# Patient Record
Sex: FEMALE | Race: WHITE | NOT HISPANIC OR LATINO | URBAN - METROPOLITAN AREA
[De-identification: names, ages, dates, MRNs, and addresses within clinical notes are randomized per-mention and may not be internally consistent; named-entity substitution may affect disease eponyms.]

---

## 2020-07-06 ENCOUNTER — WEB ENCOUNTER (OUTPATIENT)
Dept: URBAN - METROPOLITAN AREA CLINIC 35 | Facility: CLINIC | Age: 50
End: 2020-07-06

## 2020-07-08 ENCOUNTER — WEB ENCOUNTER (OUTPATIENT)
Dept: URBAN - METROPOLITAN AREA CLINIC 35 | Facility: CLINIC | Age: 50
End: 2020-07-08

## 2020-08-14 ENCOUNTER — OFFICE VISIT (OUTPATIENT)
Dept: URBAN - METROPOLITAN AREA CLINIC 35 | Facility: CLINIC | Age: 50
End: 2020-08-14

## 2020-08-14 VITALS
BODY MASS INDEX: 22.82 KG/M2 | HEIGHT: 65 IN | WEIGHT: 137 LBS | DIASTOLIC BLOOD PRESSURE: 60 MMHG | HEART RATE: 78 BPM | OXYGEN SATURATION: 98 % | SYSTOLIC BLOOD PRESSURE: 100 MMHG

## 2020-08-14 PROBLEM — 31297008 SOMATOFORM DISORDER: Status: ACTIVE | Noted: 2020-08-14

## 2020-08-14 RX ORDER — SODIUM SULFATE, POTASSIUM SULFATE, MAGNESIUM SULFATE 17.5; 3.13; 1.6 G/ML; G/ML; G/ML
AS DIRECTED SOLUTION, CONCENTRATE ORAL
Qty: 1 | Refills: 0 | Status: ON HOLD | COMMUNITY
Start: 2018-02-06

## 2020-08-14 RX ORDER — ATENOLOL 50 MG/1
1 TABLET TABLET ORAL ONCE A DAY
Status: DISCONTINUED | COMMUNITY

## 2020-08-14 RX ORDER — PANTOPRAZOLE SODIUM 40 MG/1
1 TABLET TABLET, DELAYED RELEASE ORAL ONCE A DAY
Qty: 90 TABLET | Refills: 2 | Status: ACTIVE | COMMUNITY
Start: 2018-04-27

## 2020-08-14 RX ORDER — B-COMPLEX WITH VITAMIN C
1 TABLET TABLET ORAL ONCE A DAY
Qty: 30 | Status: ACTIVE | COMMUNITY

## 2020-08-14 RX ORDER — FAMOTIDINE 40 MG/1
1 TABLET AT BEDTIME TABLET, FILM COATED ORAL ONCE A DAY
Qty: 30 | Refills: 2 | OUTPATIENT
Start: 2020-08-14

## 2020-08-14 RX ORDER — UBIDECARENONE 30 MG
CAPSULE ORAL
Status: ACTIVE | COMMUNITY

## 2020-08-14 RX ORDER — CLONAZEPAM 0.5 MG/1
1 TABLET AT BEDTIME TABLET ORAL ONCE A DAY
Status: ACTIVE | COMMUNITY

## 2020-08-14 RX ORDER — MULTIVIT-MIN/IRON/FOLIC ACID/K 18-600-40
AS DIRECTED CAPSULE ORAL
Status: ACTIVE | COMMUNITY

## 2020-08-14 NOTE — EXAM-MIGRATED EXAMINATIONS
GENERAL APPEARANCE: - pleasant, well nourished, well developed, in no acute distress;   ORAL CAVITY: - mucosa moist;   HEART: - S1, S2 normal, regular rate and rhythm;   LUNGS: - clear to auscultation bilaterally;   ABDOMEN: - soft, nontender, nondistended, no rebound tenderness, bowel sounds present;

## 2020-08-14 NOTE — HPI-MIGRATED HPI
;   ;     Follow-up : Patient presents today for with c/o acid reflux acid. She admits her symptoms began 2 months and describes them as a globus sensation, a raspy voice in the morning, she c/o intermittent nausea after eating no matter the content of the food. She reports that these symptoms occur 2-3 times a week.   She c/o that she will have a feeling of pressure starting at the bottom of her esophagus that will radiate to the top of esophagus.  She denies dysphagia at this time.    She states she will feel pressure in her chest every time after she eats.  She states she had a cardiac workup that was normal.  She admits that drinking any alcohol will burn her stomach, she reports that these are new symptoms.    She has pressure in her chest.  Patient currently reports bowel movements everyday or every other day. Her stools are formed with no blood, mucus, or melena present.   She is currently reports taking Pantoprazole 40 mg QAM and states that occasionally she will take Tums if needed. She reports that this medication hasn't helped with her symptoms in about 2 months.    Last visit (5/10/2019) Patient presents today for follow up after her EGD.  Since the procedure patient denies dysphagia, globus, changes in appetite, and changes in bowel habits. She continues to take Pantoprazole 40 mg on tab daily with on issues.;   Belching : Patient admits episodes of sporadic belching since her last office visit even after drinking water. She reports that she experience these symptoms daily. Patient admits that she has made dietary changes to try and resolve symptoms with no relief.   Last visit (5/10/2019) Patient complains of belching frequently.  She states without eating, she will still find herself belching a lot.;

## 2020-09-14 ENCOUNTER — OFFICE VISIT (OUTPATIENT)
Dept: URBAN - METROPOLITAN AREA CLINIC 35 | Facility: CLINIC | Age: 50
End: 2020-09-14

## 2020-09-14 VITALS — WEIGHT: 135 LBS | BODY MASS INDEX: 22.49 KG/M2 | HEART RATE: 77 BPM | TEMPERATURE: 98.6 F | HEIGHT: 65 IN

## 2020-09-14 RX ORDER — MULTIVIT-MIN/IRON/FOLIC ACID/K 18-600-40
AS DIRECTED CAPSULE ORAL
Status: ACTIVE | COMMUNITY

## 2020-09-14 RX ORDER — UBIDECARENONE 30 MG
CAPSULE ORAL
Status: ACTIVE | COMMUNITY

## 2020-09-14 RX ORDER — B-COMPLEX WITH VITAMIN C
1 TABLET TABLET ORAL ONCE A DAY
Qty: 30 | Status: ACTIVE | COMMUNITY

## 2020-09-14 RX ORDER — FAMOTIDINE 40 MG/1
1 TABLET AT BEDTIME TABLET, FILM COATED ORAL ONCE A DAY
Qty: 30 | Refills: 2 | Status: ACTIVE | COMMUNITY
Start: 2020-08-14

## 2020-09-14 RX ORDER — DEXLANSOPRAZOLE 60 MG/1
1 CAPSULE CAPSULE, DELAYED RELEASE ORAL ONCE A DAY
Qty: 30 | Refills: 6 | OUTPATIENT
Start: 2020-09-14

## 2020-09-14 RX ORDER — CLONAZEPAM 0.5 MG/1
1 TABLET AT BEDTIME TABLET ORAL ONCE A DAY
Status: ACTIVE | COMMUNITY

## 2020-09-14 RX ORDER — PANTOPRAZOLE SODIUM 40 MG/1
1 TABLET TABLET, DELAYED RELEASE ORAL ONCE A DAY
Qty: 90 TABLET | Refills: 2 | Status: ACTIVE | COMMUNITY
Start: 2018-04-27

## 2020-09-14 RX ORDER — SODIUM SULFATE, POTASSIUM SULFATE, MAGNESIUM SULFATE 17.5; 3.13; 1.6 G/ML; G/ML; G/ML
AS DIRECTED SOLUTION, CONCENTRATE ORAL
Qty: 1 | Refills: 0 | Status: ON HOLD | COMMUNITY
Start: 2018-02-06

## 2020-09-14 NOTE — HPI-MIGRATED HPI
;   ;     Belching : Admits Belching with Dexilant and Famotidine. But since she is back to Pantoprazole, she has been belching post prandial daily.   Last visit (08/14/2020)              Patient admits episodes of sporadic belching since her last office visit even after drinking water. She reports that she experience these symptoms daily. Patient admits that she has made dietary changes to try and resolve symptoms with no relief.   Last visit (5/10/2019) Patient complains of belching frequently.  She states without eating, she will still find herself belching a lot.;   Acid Reflux : Patient is a 50-year-old  female, who presents today to follow up for acid reflux. Admits relief of acid reflux with Dexilant in the morning and  Famotidine Tablet, 40 MG at night. However, since she is back on Pantoprazole, she has been experiencing acid reflux again.   Also admits bloating/gas.  Patient denies dysphagia, globus, sour eructations, indigestion, early satiety, changes in appetite, coughing, nausea, vomiting, abdominal/epigastric pain, or changes in bowel habits.  Patient states that she has not had any recent changes in her medications.  Last visit (08/14/2020)              Patient presents today for with c/o acid reflux acid. She admits her symptoms began 2 months and describes them as a globus sensation, a raspy voice in the morning, she c/o intermittent nausea after eating no matter the content of the food. She reports that these symptoms occur 2-3 times a week.   She c/o that she will have a feeling of pressure starting at the bottom of her esophagus that will radiate to the top of esophagus.  She denies dysphagia at this time.    She states she will feel pressure in her chest every time after she eats.  She states she had a cardiac workup that was normal.  She admits that drinking any alcohol will burn her stomach, she reports that these are new symptoms.    She has pressure in her chest.  Patient currently reports bowel movements everyday or every other day. Her stools are formed with no blood, mucus, or melena present.   She is currently reports taking Pantoprazole 40 mg QAM and states that occasionally she will take Tums if needed. She reports that this medication hasn't helped with her symptoms in about 2 months.    Last visit (5/10/2019) Patient presents today for follow up after her EGD.  Since the procedure patient denies dysphagia, globus, changes in appetite, and changes in bowel habits. She continues to take Pantoprazole 40 mg on tab daily with on issues.;

## 2020-09-16 ENCOUNTER — TELEPHONE ENCOUNTER (OUTPATIENT)
Dept: URBAN - METROPOLITAN AREA CLINIC 35 | Facility: CLINIC | Age: 50
End: 2020-09-16

## 2020-09-24 ENCOUNTER — WEB ENCOUNTER (OUTPATIENT)
Dept: URBAN - METROPOLITAN AREA CLINIC 35 | Facility: CLINIC | Age: 50
End: 2020-09-24

## 2020-09-24 ENCOUNTER — TELEPHONE ENCOUNTER (OUTPATIENT)
Dept: URBAN - METROPOLITAN AREA CLINIC 35 | Facility: CLINIC | Age: 50
End: 2020-09-24

## 2020-09-25 ENCOUNTER — WEB ENCOUNTER (OUTPATIENT)
Dept: URBAN - METROPOLITAN AREA CLINIC 35 | Facility: CLINIC | Age: 50
End: 2020-09-25

## 2020-09-25 RX ORDER — DEXLANSOPRAZOLE 60 MG/1
1 CAPSULE CAPSULE, DELAYED RELEASE ORAL ONCE A DAY
Qty: 90 CAPSULE | Refills: 1
Start: 2020-09-14

## 2020-12-08 ENCOUNTER — WEB ENCOUNTER (OUTPATIENT)
Dept: URBAN - METROPOLITAN AREA CLINIC 35 | Facility: CLINIC | Age: 50
End: 2020-12-08

## 2020-12-14 ENCOUNTER — OFFICE VISIT (OUTPATIENT)
Dept: URBAN - METROPOLITAN AREA CLINIC 35 | Facility: CLINIC | Age: 50
End: 2020-12-14

## 2020-12-14 NOTE — HPI-MIGRATED HPI
;   ;     Hoyos's Esophagus : Patient is a 50-year-old  female, who presents today to follow up for Hoyos's esophagus. Admits relief w/ Dexilant Capsule Delayed Release, 60 MG, 1 capsule? Famotidine Tablet, 40 MG at night  Last visit (09/14/2020)       Patient is a 50-year-old  female, who presents today to follow up for acid reflux. Admits relief of acid reflux with Dexilant in the morning and  Famotidine Tablet, 40 MG at night. However, since she is back on Pantoprazole, she has been experiencing acid reflux again.   Also admits bloating/gas.  Patient denies dysphagia, globus, sour eructations, indigestion, early satiety, changes in appetite, coughing, nausea, vomiting, abdominal/epigastric pain, or changes in bowel habits.  Patient states that she has not had any recent changes in her medications.  Last visit (08/14/2020)              Patient presents today for with c/o acid reflux acid. She admits her symptoms began 2 months and describes them as a globus sensation, a raspy voice in the morning, she c/o intermittent nausea after eating no matter the content of the food. She reports that these symptoms occur 2-3 times a week.   She c/o that she will have a feeling of pressure starting at the bottom of her esophagus that will radiate to the top of esophagus.  She denies dysphagia at this time.    She states she will feel pressure in her chest every time after she eats.  She states she had a cardiac workup that was normal.  She admits that drinking any alcohol will burn her stomach, she reports that these are new symptoms.    She has pressure in her chest.  Patient currently reports bowel movements everyday or every other day. Her stools are formed with no blood, mucus, or melena present.   She is currently reports taking Pantoprazole 40 mg QAM and states that occasionally she will take Tums if needed. She reports that this medication hasn't helped with her symptoms in about 2 months.    Last visit (5/10/2019) Patient presents today for follow up after her EGD.  Since the procedure patient denies dysphagia, globus, changes in appetite, and changes in bowel habits. She continues to take Pantoprazole 40 mg on tab daily with on issues.;   Belching :     Last visit (09/14/2020)       Admits Belching with Dexilant and Famotidine. But since she is back to Pantoprazole, she has been belching post prandial daily.   Last visit (08/14/2020)              Patient admits episodes of sporadic belching since her last office visit even after drinking water. She reports that she experience these symptoms daily. Patient admits that she has made dietary changes to try and resolve symptoms with no relief.   Last visit (5/10/2019) Patient complains of belching frequently.  She states without eating, she will still find herself belching a lot.;

## 2020-12-28 ENCOUNTER — TELEPHONE ENCOUNTER (OUTPATIENT)
Dept: URBAN - METROPOLITAN AREA CLINIC 35 | Facility: CLINIC | Age: 50
End: 2020-12-28

## 2020-12-29 ENCOUNTER — TELEPHONE ENCOUNTER (OUTPATIENT)
Dept: URBAN - METROPOLITAN AREA SURGERY CENTER 8 | Facility: SURGERY CENTER | Age: 50
End: 2020-12-29

## 2020-12-29 ENCOUNTER — OFFICE VISIT (OUTPATIENT)
Dept: URBAN - METROPOLITAN AREA CLINIC 33 | Facility: CLINIC | Age: 50
End: 2020-12-29

## 2020-12-29 VITALS
HEART RATE: 74 BPM | OXYGEN SATURATION: 99 % | HEIGHT: 65 IN | TEMPERATURE: 96.9 F | DIASTOLIC BLOOD PRESSURE: 70 MMHG | SYSTOLIC BLOOD PRESSURE: 110 MMHG | BODY MASS INDEX: 23.32 KG/M2 | WEIGHT: 140 LBS

## 2020-12-29 PROBLEM — 72007001 DUODENITIS: Status: ACTIVE | Noted: 2019-05-10

## 2020-12-29 RX ORDER — B-COMPLEX WITH VITAMIN C
1 TABLET TABLET ORAL ONCE A DAY
Qty: 30 | Status: ACTIVE | COMMUNITY

## 2020-12-29 RX ORDER — FAMOTIDINE 40 MG/1
1 TABLET AT BEDTIME TABLET, FILM COATED ORAL ONCE A DAY
Qty: 30 | Refills: 2 | Status: ON HOLD | COMMUNITY
Start: 2020-08-14

## 2020-12-29 RX ORDER — UBIDECARENONE 30 MG
CAPSULE ORAL
Status: ACTIVE | COMMUNITY

## 2020-12-29 RX ORDER — CLONAZEPAM 0.5 MG/1
1 TABLET AT BEDTIME TABLET ORAL ONCE A DAY
Status: ACTIVE | COMMUNITY

## 2020-12-29 RX ORDER — SODIUM SULFATE, POTASSIUM SULFATE, MAGNESIUM SULFATE 17.5; 3.13; 1.6 G/ML; G/ML; G/ML
AS DIRECTED SOLUTION, CONCENTRATE ORAL
Qty: 1 | Refills: 0 | Status: ON HOLD | COMMUNITY
Start: 2018-02-06

## 2020-12-29 RX ORDER — MULTIVIT-MIN/IRON/FOLIC ACID/K 18-600-40
AS DIRECTED CAPSULE ORAL
Status: ACTIVE | COMMUNITY

## 2020-12-29 RX ORDER — SUCRALFATE 1 G/10ML
10 ML ON AN EMPTY STOMACH BEFORE MEALS SUSPENSION ORAL TWICE A DAY
Qty: 280 ML | Refills: 0 | OUTPATIENT
Start: 2020-12-29

## 2020-12-29 RX ORDER — DEXLANSOPRAZOLE 60 MG/1
1 CAPSULE CAPSULE, DELAYED RELEASE ORAL ONCE A DAY
Qty: 90 CAPSULE | Refills: 1 | Status: ACTIVE | COMMUNITY
Start: 2020-09-14

## 2020-12-29 RX ORDER — PANTOPRAZOLE SODIUM 40 MG/1
1 TABLET TABLET, DELAYED RELEASE ORAL ONCE A DAY
Qty: 90 TABLET | Refills: 2 | Status: ON HOLD | COMMUNITY
Start: 2018-04-27

## 2020-12-29 NOTE — HPI-MIGRATED HPI
;   ;     Hoyos's Esophagus : Patient is a 50-year-old  female, who presents today to follow up for Hoyos's esophagus. admits to some relief w/ Dexilant Capsule Delayed Release, 60 MG, 1 capsule. She admits the Dexilant has helped with the belching. Denies any relief of heartburn and a burning sensation in her epigastric region. Denies continued use of Famotidine Tablet, 40 MG at night.   Admits to occasional episodes of constipation. Currently has 1-2 BM's a day. Stools are soft and formed, Denies blood, mucus or melena.  Denies any OTC medications for relief of constipation symptoms. She states she will having some occasional episodes of bloating/gas when she is constipated.      Last visit (09/14/2020)       Patient is a 50-year-old  female, who presents today to follow up for acid reflux. Admits relief of acid reflux with Dexilant in the morning and  Famotidine Tablet, 40 MG at night. However, since she is back on Pantoprazole, she has been experiencing acid reflux again.   Also admits bloating/gas.  Patient denies dysphagia, globus, sour eructations, indigestion, early satiety, changes in appetite, coughing, nausea, vomiting, abdominal/epigastric pain, or changes in bowel habits.  Patient states that she has not had any recent changes in her medications.  Last visit (08/14/2020)              Patient presents today for with c/o acid reflux acid. She admits her symptoms began 2 months and describes them as a globus sensation, a raspy voice in the morning, she c/o intermittent nausea after eating no matter the content of the food. She reports that these symptoms occur 2-3 times a week.   She c/o that she will have a feeling of pressure starting at the bottom of her esophagus that will radiate to the top of esophagus.  She denies dysphagia at this time.    She states she will feel pressure in her chest every time after she eats.  She states she had a cardiac workup that was normal.  She admits that drinking any alcohol will burn her stomach, she reports that these are new symptoms.    She has pressure in her chest.  Patient currently reports bowel movements everyday or every other day. Her stools are formed with no blood, mucus, or melena present.   She is currently reports taking Pantoprazole 40 mg QAM and states that occasionally she will take Tums if needed. She reports that this medication hasn't helped with her symptoms in about 2 months.    Last visit (5/10/2019) Patient presents today for follow up after her EGD.  Since the procedure patient denies dysphagia, globus, changes in appetite, and changes in bowel habits. She continues to take Pantoprazole 40 mg on tab daily with on issues.;   Belching : Denies any episodes of Belching since her last visit. Admits symptoms are controlled by Dexilant 60 mg.   Last visit (09/14/2020)       Admits Belching with Dexilant and Famotidine. But since she is back to Pantoprazole, she has been belching post prandial daily.   Last visit (08/14/2020)              Patient admits episodes of sporadic belching since her last office visit even after drinking water. She reports that she experience these symptoms daily. Patient admits that she has made dietary changes to try and resolve symptoms with no relief.   Last visit (5/10/2019) Patient complains of belching frequently.  She states without eating, she will still find herself belching a lot.;

## 2021-01-06 ENCOUNTER — TELEPHONE ENCOUNTER (OUTPATIENT)
Dept: URBAN - METROPOLITAN AREA CLINIC 35 | Facility: CLINIC | Age: 51
End: 2021-01-06

## 2021-01-07 ENCOUNTER — TELEPHONE ENCOUNTER (OUTPATIENT)
Dept: URBAN - METROPOLITAN AREA CLINIC 35 | Facility: CLINIC | Age: 51
End: 2021-01-07

## 2021-01-11 ENCOUNTER — OFFICE VISIT (OUTPATIENT)
Dept: URBAN - METROPOLITAN AREA CLINIC 35 | Facility: CLINIC | Age: 51
End: 2021-01-11

## 2021-01-22 ENCOUNTER — OFFICE VISIT (OUTPATIENT)
Dept: URBAN - METROPOLITAN AREA SURGERY CENTER 8 | Facility: SURGERY CENTER | Age: 51
End: 2021-01-22

## 2021-01-22 ENCOUNTER — WEB ENCOUNTER (OUTPATIENT)
Dept: URBAN - METROPOLITAN AREA CLINIC 35 | Facility: CLINIC | Age: 51
End: 2021-01-22

## 2021-02-08 ENCOUNTER — OFFICE VISIT (OUTPATIENT)
Dept: URBAN - METROPOLITAN AREA CLINIC 35 | Facility: CLINIC | Age: 51
End: 2021-02-08

## 2021-02-08 ENCOUNTER — TELEPHONE ENCOUNTER (OUTPATIENT)
Dept: URBAN - METROPOLITAN AREA CLINIC 35 | Facility: CLINIC | Age: 51
End: 2021-02-08

## 2021-02-08 VITALS
TEMPERATURE: 97.7 F | HEART RATE: 73 BPM | HEIGHT: 65 IN | BODY MASS INDEX: 23.72 KG/M2 | OXYGEN SATURATION: 98 % | SYSTOLIC BLOOD PRESSURE: 105 MMHG | WEIGHT: 142.4 LBS | DIASTOLIC BLOOD PRESSURE: 65 MMHG

## 2021-02-08 RX ORDER — MULTIVIT-MIN/IRON/FOLIC ACID/K 18-600-40
AS DIRECTED CAPSULE ORAL
Status: ACTIVE | COMMUNITY

## 2021-02-08 RX ORDER — SODIUM SULFATE, POTASSIUM SULFATE, MAGNESIUM SULFATE 17.5; 3.13; 1.6 G/ML; G/ML; G/ML
AS DIRECTED SOLUTION, CONCENTRATE ORAL
Qty: 1 | Refills: 0 | Status: ON HOLD | COMMUNITY
Start: 2018-02-06

## 2021-02-08 RX ORDER — CLONAZEPAM 0.5 MG/1
1 TABLET AT BEDTIME TABLET ORAL ONCE A DAY
Status: ACTIVE | COMMUNITY

## 2021-02-08 RX ORDER — UBIDECARENONE 30 MG
CAPSULE ORAL
Status: ACTIVE | COMMUNITY

## 2021-02-08 RX ORDER — FAMOTIDINE 40 MG/1
1 TABLET AT BEDTIME TABLET, FILM COATED ORAL ONCE A DAY
Qty: 30 | Refills: 2 | Status: ON HOLD | COMMUNITY
Start: 2020-08-14

## 2021-02-08 RX ORDER — B-COMPLEX WITH VITAMIN C
1 TABLET TABLET ORAL ONCE A DAY
Qty: 30 | Status: ACTIVE | COMMUNITY

## 2021-02-08 RX ORDER — DEXLANSOPRAZOLE 60 MG/1
1 CAPSULE CAPSULE, DELAYED RELEASE ORAL ONCE A DAY
Qty: 90 CAPSULE | Refills: 1 | Status: ACTIVE | COMMUNITY
Start: 2020-09-14

## 2021-02-08 RX ORDER — PANTOPRAZOLE SODIUM 40 MG/1
1 TABLET TABLET, DELAYED RELEASE ORAL ONCE A DAY
Qty: 90 TABLET | Refills: 2 | Status: ON HOLD | COMMUNITY
Start: 2018-04-27

## 2021-02-08 RX ORDER — SUCRALFATE 1 G/10ML
10 ML ON AN EMPTY STOMACH BEFORE MEALS SUSPENSION ORAL TWICE A DAY
Qty: 280 ML | Refills: 0 | Status: ON HOLD | COMMUNITY
Start: 2020-12-29

## 2021-02-08 NOTE — HPI-MIGRATED HPI
;   ;     Hoyos's Esophagus : Patient is a 50-year-old  female who presents today to follow up s/p EGD. EGD performed on 01/22/2021. Patient denies any complications after the procedure.  She currently takes Dexilant Capsule Delayed Release, 60 MG, 1 capsule a day. She admits relief of epigastric burning sensation with Carafate Suspension, 1 GM/10ML Twice a day for 2 weeeks. She still admits burning sensation in epigastrium area, now that she does not take Carafate. Gaviscon somewhat relieved burning sensation. Denies new symptoms.    Last visit (12/19/2020)              Patient is a 50-year-old  female, who presents today to follow up for Hoyos's esophagus. admits to some relief w/ Dexilant Capsule Delayed Release, 60 MG, 1 capsule. She admits the Dexilant has helped with the belching. Denies any relief of heartburn and a burning sensation in her epigastric region. Denies continued use of Famotidine Tablet, 40 MG at night.   Admits to occasional episodes of constipation. Currently has 1-2 BM's a day. Stools are soft and formed, Denies blood, mucus or melena.  Denies any OTC medications for relief of constipation symptoms. She states she will having some occasional episodes of bloating/gas when she is constipated.      Last visit (09/14/2020)       Patient is a 50-year-old  female, who presents today to follow up for acid reflux. Admits relief of acid reflux with Dexilant in the morning and  Famotidine Tablet, 40 MG at night. However, since she is back on Pantoprazole, she has been experiencing acid reflux again.   Also admits bloating/gas.  Patient denies dysphagia, globus, sour eructations, indigestion, early satiety, changes in appetite, coughing, nausea, vomiting, abdominal/epigastric pain, or changes in bowel habits.  Patient states that she has not had any recent changes in her medications.  Last visit (08/14/2020)              Patient presents today for with c/o acid reflux acid. She admits her symptoms began 2 months and describes them as a globus sensation, a raspy voice in the morning, she c/o intermittent nausea after eating no matter the content of the food. She reports that these symptoms occur 2-3 times a week.   She c/o that she will have a feeling of pressure starting at the bottom of her esophagus that will radiate to the top of esophagus.  She denies dysphagia at this time.    She states she will feel pressure in her chest every time after she eats.  She states she had a cardiac workup that was normal.  She admits that drinking any alcohol will burn her stomach, she reports that these are new symptoms.    She has pressure in her chest.  Patient currently reports bowel movements everyday or every other day. Her stools are formed with no blood, mucus, or melena present.   She is currently reports taking Pantoprazole 40 mg QAM and states that occasionally she will take Tums if needed. She reports that this medication hasn't helped with her symptoms in about 2 months.    Last visit (5/10/2019) Patient presents today for follow up after her EGD.  Since the procedure patient denies dysphagia, globus, changes in appetite, and changes in bowel habits. She continues to take Pantoprazole 40 mg on tab daily with on issues.;   Belching : Continues to admit belching after eating or drinking liquid. Admits some relief with Dexilant 60 mg.   Last visit (12/19/2020)              Denies any episodes of Belching since her last visit. Admits symptoms are controlled by Dexilant 60 mg.   Last visit (09/14/2020)       Admits Belching with Dexilant and Famotidine. But since she is back to Pantoprazole, she has been belching post prandial daily.   Last visit (08/14/2020)              Patient admits episodes of sporadic belching since her last office visit even after drinking water. She reports that she experience these symptoms daily. Patient admits that she has made dietary changes to try and resolve symptoms with no relief.   Last visit (5/10/2019) Patient complains of belching frequently.  She states without eating, she will still find herself belching a lot.;

## 2021-04-12 ENCOUNTER — WEB ENCOUNTER (OUTPATIENT)
Dept: URBAN - METROPOLITAN AREA CLINIC 35 | Facility: CLINIC | Age: 51
End: 2021-04-12

## 2021-04-12 RX ORDER — DEXLANSOPRAZOLE 60 MG/1
1 CAPSULE CAPSULE, DELAYED RELEASE ORAL ONCE A DAY
Qty: 90 CAPSULE | Refills: 1 | OUTPATIENT
Start: 2020-09-14

## 2021-10-06 ENCOUNTER — WEB ENCOUNTER (OUTPATIENT)
Dept: URBAN - METROPOLITAN AREA CLINIC 35 | Facility: CLINIC | Age: 51
End: 2021-10-06

## 2021-10-06 RX ORDER — DEXLANSOPRAZOLE 60 MG/1
1 CAPSULE CAPSULE, DELAYED RELEASE ORAL ONCE A DAY
Qty: 90 CAPSULE | Refills: 3 | OUTPATIENT
Start: 2020-09-14

## 2021-10-07 ENCOUNTER — OFFICE VISIT (OUTPATIENT)
Dept: URBAN - METROPOLITAN AREA CLINIC 35 | Facility: CLINIC | Age: 51
End: 2021-10-07

## 2021-10-07 VITALS
OXYGEN SATURATION: 98 % | WEIGHT: 140 LBS | SYSTOLIC BLOOD PRESSURE: 110 MMHG | BODY MASS INDEX: 23.32 KG/M2 | DIASTOLIC BLOOD PRESSURE: 74 MMHG | HEIGHT: 65 IN | HEART RATE: 70 BPM

## 2021-10-07 RX ORDER — FAMOTIDINE 40 MG/1
1 TABLET AT BEDTIME TABLET, FILM COATED ORAL ONCE A DAY
Qty: 30 | Refills: 2 | Status: ON HOLD | COMMUNITY
Start: 2020-08-14

## 2021-10-07 RX ORDER — UBIDECARENONE 30 MG
CAPSULE ORAL
Status: ACTIVE | COMMUNITY

## 2021-10-07 RX ORDER — B-COMPLEX WITH VITAMIN C
1 TABLET TABLET ORAL ONCE A DAY
Qty: 30 | Status: ON HOLD | COMMUNITY

## 2021-10-07 RX ORDER — SODIUM SULFATE, POTASSIUM SULFATE, MAGNESIUM SULFATE 17.5; 3.13; 1.6 G/ML; G/ML; G/ML
AS DIRECTED SOLUTION, CONCENTRATE ORAL
Qty: 1 | Refills: 0 | Status: ON HOLD | COMMUNITY
Start: 2018-02-06

## 2021-10-07 RX ORDER — DEXLANSOPRAZOLE 60 MG/1
1 CAPSULE CAPSULE, DELAYED RELEASE ORAL ONCE A DAY
Qty: 90 CAPSULE | Refills: 1 | Status: ACTIVE | COMMUNITY
Start: 2020-09-14

## 2021-10-07 RX ORDER — CLONAZEPAM 0.5 MG/1
1 TABLET AT BEDTIME TABLET ORAL ONCE A DAY
Status: ACTIVE | COMMUNITY

## 2021-10-07 RX ORDER — MULTIVIT-MIN/IRON/FOLIC ACID/K 18-600-40
AS DIRECTED CAPSULE ORAL
Status: ACTIVE | COMMUNITY

## 2021-10-07 RX ORDER — PANTOPRAZOLE SODIUM 40 MG/1
1 TABLET TABLET, DELAYED RELEASE ORAL ONCE A DAY
Qty: 90 TABLET | Refills: 2 | Status: ON HOLD | COMMUNITY
Start: 2018-04-27

## 2021-10-07 RX ORDER — DEXLANSOPRAZOLE 60 MG/1
1 CAPSULE CAPSULE, DELAYED RELEASE ORAL ONCE A DAY
Qty: 90 CAPSULE | Refills: 3 | OUTPATIENT
Start: 2020-09-14

## 2021-10-07 RX ORDER — SUCRALFATE 1 G/10ML
10 ML ON AN EMPTY STOMACH BEFORE MEALS SUSPENSION ORAL TWICE A DAY
Qty: 280 ML | Refills: 0 | Status: ON HOLD | COMMUNITY
Start: 2020-12-29

## 2021-10-07 NOTE — HPI-MIGRATED HPI
;   ;   ;     Hoyos's Esophagus : She is currently taking Dexilant 60 mg with control of symptoms. She denies any breakthrough symptoms.         Last visit 02/08/2021 Patient is a 50-year-old  female who presents today to follow up s/p EGD. EGD performed on 01/22/2021. Patient denies any complications after the procedure.  She currently takes Dexilant Capsule Delayed Release, 60 MG, 1 capsule a day. She admits relief of epigastric burning sensation with Carafate Suspension, 1 GM/10 ML Twice a day for 2 weeks. She still admits burning sensation in epigastrium area, now that she does not take Carafate. Gaviscon somewhat relieved burning sensation. Denies new symptoms.    Last visit (12/19/2020)              Patient is a 50-year-old  female, who presents today to follow up for Hoyos's esophagus. admits to some relief w/ Dexilant Capsule Delayed Release, 60 MG, 1 capsule. She admits the Dexilant has helped with the belching. Denies any relief of heartburn and a burning sensation in her epigastric region. Denies continued use of Famotidine Tablet, 40 MG at night.   Admits to occasional episodes of constipation. Currently has 1-2 BM's a day. Stools are soft and formed, Denies blood, mucus or melena.  Denies any OTC medications for relief of constipation symptoms. She states she will having some occasional episodes of bloating/gas when she is constipated.      Last visit (09/14/2020)       Patient is a 50-year-old  female, who presents today to follow up for acid reflux. Admits relief of acid reflux with Dexilant in the morning and  Famotidine Tablet, 40 MG at night. However, since she is back on Pantoprazole, she has been experiencing acid reflux again.   Also admits bloating/gas.  Patient denies dysphagia, globus, sour eructations, indigestion, early satiety, changes in appetite, coughing, nausea, vomiting, abdominal/epigastric pain, or changes in bowel habits.  Patient states that she has not had any recent changes in her medications.  Last visit (08/14/2020)              Patient presents today for with c/o acid reflux acid. She admits her symptoms began 2 months and describes them as a globus sensation, a raspy voice in the morning, she c/o intermittent nausea after eating no matter the content of the food. She reports that these symptoms occur 2-3 times a week.   She c/o that she will have a feeling of pressure starting at the bottom of her esophagus that will radiate to the top of esophagus.  She denies dysphagia at this time.    She states she will feel pressure in her chest every time after she eats.  She states she had a cardiac workup that was normal.  She admits that drinking any alcohol will burn her stomach, she reports that these are new symptoms.    She has pressure in her chest.  Patient currently reports bowel movements everyday or every other day. Her stools are formed with no blood, mucus, or melena present.   She is currently reports taking Pantoprazole 40 mg QAM and states that occasionally she will take Tums if needed. She reports that this medication hasn't helped with her symptoms in about 2 months.    Last visit (5/10/2019) Patient presents today for follow up after her EGD.  Since the procedure patient denies dysphagia, globus, changes in appetite, and changes in bowel habits. She continues to take Pantoprazole 40 mg on tab daily with on issues.;   Belching : She denies any episodes of belching since last visit.        Last visit 02/08/2021 Continues to admit belching after eating or drinking liquid. Admits some relief with Dexilant 60 mg.   Last visit (12/19/2020)              Denies any episodes of Belching since her last visit. Admits symptoms are controlled by Dexilant 60 mg.   Last visit (09/14/2020)       Admits Belching with Dexilant and Famotidine. But since she is back to Pantoprazole, she has been belching post prandial daily.   Last visit (08/14/2020)              Patient admits episodes of sporadic belching since her last office visit even after drinking water. She reports that she experience these symptoms daily. Patient admits that she has made dietary changes to try and resolve symptoms with no relief.   Last visit (5/10/2019) Patient complains of belching frequently.  She states without eating, she will still find herself belching a lot.;   Change in bowel habits : 51 year old female who presents today for a consult for change in bowel habits. Patient states she has been having constipation. Onset began in early September.   Currently admits 3-4 bowel movements per week. Stools are normal without blood, mucus, or melena in stools. Patient states that she has been seeing white specks present in the stool. Her previous bowel habits were 1 movement per day, with normal stools.   She denies any diarrhea.  She has been gluten-free for years.  She denies aggravating/alleviating factors and has not tried any medications.  She admits associated LUQ abdominal pains just below ribs, bloating, and gas. When she has a bowel movement she does feel like she is completely emptying her bowels.  She states she saw her PCP, CT abd was fine, and then she was put on steroids, which helped the pain but now it's back again.  She can get on her back and the pain resolves, but laying on her left side is an issue.  She denies fecal incontinence.  Her last colonoscopy was 04/13/2018 ith Dr Chacon. She was placed on a 5 year repeat.  ;

## 2021-10-07 NOTE — EXAM-MIGRATED EXAMINATIONS
GENERAL APPEARANCE: - alert, well hydrated, in no distress ;   ORAL CAVITY: - mucosa moist;   HEART: - S1, S2 normal, regular rate and rhythm;   LUNGS: - clear to auscultation bilaterally;   ABDOMEN: - soft,   +mildly tender LUQ, nondistended, no rebound tenderness, bowel sounds present;

## 2021-10-18 ENCOUNTER — WEB ENCOUNTER (OUTPATIENT)
Dept: URBAN - METROPOLITAN AREA CLINIC 35 | Facility: CLINIC | Age: 51
End: 2021-10-18

## 2021-10-19 ENCOUNTER — WEB ENCOUNTER (OUTPATIENT)
Dept: URBAN - METROPOLITAN AREA CLINIC 35 | Facility: CLINIC | Age: 51
End: 2021-10-19

## 2021-10-25 ENCOUNTER — TELEPHONE ENCOUNTER (OUTPATIENT)
Dept: URBAN - METROPOLITAN AREA CLINIC 35 | Facility: CLINIC | Age: 51
End: 2021-10-25

## 2021-11-01 ENCOUNTER — TELEPHONE ENCOUNTER (OUTPATIENT)
Dept: URBAN - METROPOLITAN AREA CLINIC 35 | Facility: CLINIC | Age: 51
End: 2021-11-01

## 2021-11-01 ENCOUNTER — OFFICE VISIT (OUTPATIENT)
Dept: URBAN - METROPOLITAN AREA CLINIC 35 | Facility: CLINIC | Age: 51
End: 2021-11-01

## 2021-11-01 RX ORDER — RIFAXIMIN 550 MG/1
1 TABLET TABLET ORAL THREE TIMES A DAY
Qty: 30 TABLET | Refills: 0 | OUTPATIENT
Start: 2021-11-01

## 2021-11-15 ENCOUNTER — TELEPHONE ENCOUNTER (OUTPATIENT)
Dept: URBAN - METROPOLITAN AREA CLINIC 35 | Facility: CLINIC | Age: 51
End: 2021-11-15

## 2021-11-15 ENCOUNTER — OFFICE VISIT (OUTPATIENT)
Dept: URBAN - METROPOLITAN AREA CLINIC 35 | Facility: CLINIC | Age: 51
End: 2021-11-15

## 2021-11-15 VITALS
SYSTOLIC BLOOD PRESSURE: 120 MMHG | WEIGHT: 145 LBS | BODY MASS INDEX: 24.16 KG/M2 | HEIGHT: 65 IN | HEART RATE: 86 BPM | DIASTOLIC BLOOD PRESSURE: 70 MMHG | OXYGEN SATURATION: 98 %

## 2021-11-15 PROBLEM — 428283002 HISTORY OF POLYP OF COLON (SITUATION): Status: ACTIVE | Noted: 2018-02-06

## 2021-11-15 PROBLEM — 14760008 CONSTIPATION: Status: ACTIVE | Noted: 2021-10-07

## 2021-11-15 RX ORDER — DEXLANSOPRAZOLE 60 MG/1
1 CAPSULE CAPSULE, DELAYED RELEASE ORAL ONCE A DAY
Qty: 90 CAPSULE | Refills: 3 | COMMUNITY
Start: 2020-09-14

## 2021-11-15 RX ORDER — MULTIVIT-MIN/IRON/FOLIC ACID/K 18-600-40
AS DIRECTED CAPSULE ORAL
Status: ON HOLD | COMMUNITY

## 2021-11-15 RX ORDER — FAMOTIDINE 40 MG/1
1 TABLET AT BEDTIME TABLET, FILM COATED ORAL ONCE A DAY
Qty: 30 | Refills: 2 | Status: ON HOLD | COMMUNITY
Start: 2020-08-14

## 2021-11-15 RX ORDER — B-COMPLEX WITH VITAMIN C
1 TABLET TABLET ORAL ONCE A DAY
Qty: 30 | Status: ON HOLD | COMMUNITY

## 2021-11-15 RX ORDER — SODIUM SULFATE, POTASSIUM SULFATE, MAGNESIUM SULFATE 17.5; 3.13; 1.6 G/ML; G/ML; G/ML
AS DIRECTED SOLUTION, CONCENTRATE ORAL
Qty: 1 | Refills: 0 | Status: ON HOLD | COMMUNITY
Start: 2018-02-06

## 2021-11-15 RX ORDER — DEXLANSOPRAZOLE 60 MG/1
1 CAPSULE CAPSULE, DELAYED RELEASE ORAL ONCE A DAY
Qty: 30 | Status: ACTIVE | COMMUNITY

## 2021-11-15 RX ORDER — PANTOPRAZOLE SODIUM 40 MG/1
1 TABLET TABLET, DELAYED RELEASE ORAL ONCE A DAY
Qty: 90 TABLET | Refills: 2 | Status: ON HOLD | COMMUNITY
Start: 2018-04-27

## 2021-11-15 RX ORDER — SUCRALFATE 1 G/10ML
10 ML ON AN EMPTY STOMACH BEFORE MEALS SUSPENSION ORAL TWICE A DAY
Qty: 280 ML | Refills: 0 | Status: ON HOLD | COMMUNITY
Start: 2020-12-29

## 2021-11-15 RX ORDER — LORATADINE 10 MG
1 PACKET MIXED WITH 8 OUNCES OF FLUID TABLET,DISINTEGRATING ORAL ONCE A DAY
Qty: 30 | Status: ACTIVE | COMMUNITY

## 2021-11-15 RX ORDER — RIFAXIMIN 550 MG/1
1 TABLET TABLET ORAL THREE TIMES A DAY
Qty: 30 TABLET | Refills: 0 | COMMUNITY
Start: 2021-11-01

## 2021-11-15 RX ORDER — CLONAZEPAM 0.5 MG/1
1 TABLET AT BEDTIME TABLET ORAL ONCE A DAY
Status: ACTIVE | COMMUNITY

## 2021-11-15 RX ORDER — UBIDECARENONE 30 MG
CAPSULE ORAL
Status: ACTIVE | COMMUNITY

## 2021-11-15 NOTE — HPI-MIGRATED HPI
;   ;   ;   ;     Gas : She admits episodes of gas since her last visit . She completed a lactulose SIBO test on 11/01/2021 and a Fructose malabsorption on 10/25/2021 which both revealed a supported result. She also completed a Sucrose Malabsorption on 10/23/2021 which revealed a not supported result. She was treated with Xifaxan 550 mg TID for 10 days. She started on 11/02/20210 and finished treatment on 11/12/2021. She admits a little noticeable improvement since completing treatment. She has tried to cut out some sugar intact since testing positive. ;   Hoyos's Esophagus : She is still currently taking Dexilant 60 mg daily without control of symptoms. She admit  breakthrough symptoms at least a few times a week. She admits occasional episodes of bloating/gas.         Last Visit (10/07/2021) She is currently taking Dexilant 60 mg with control of symptoms. She denies any breakthrough symptoms.         Last visit 02/08/2021 Patient is a 50-year-old  female who presents today to follow up s/p EGD. EGD performed on 01/22/2021. Patient denies any complications after the procedure.  She currently takes Dexilant Capsule Delayed Release, 60 MG, 1 capsule a day. She admits relief of epigastric burning sensation with Carafate Suspension, 1 GM/10 ML Twice a day for 2 weeks. She still admits burning sensation in epigastrium area, now that she does not take Carafate. Gaviscon somewhat relieved burning sensation. Denies new symptoms.    Last visit (12/19/2020)              Patient is a 50-year-old  female, who presents today to follow up for Hoyos's esophagus. admits to some relief w/ Dexilant Capsule Delayed Release, 60 MG, 1 capsule. She admits the Dexilant has helped with the belching. Denies any relief of heartburn and a burning sensation in her epigastric region. Denies continued use of Famotidine Tablet, 40 MG at night.   Admits to occasional episodes of constipation. Currently has 1-2 BM's a day. Stools are soft and formed, Denies blood, mucus or melena.  Denies any OTC medications for relief of constipation symptoms. She states she will having some occasional episodes of bloating/gas when she is constipated.      Last visit (09/14/2020)       Patient is a 50-year-old  female, who presents today to follow up for acid reflux. Admits relief of acid reflux with Dexilant in the morning and  Famotidine Tablet, 40 MG at night. However, since she is back on Pantoprazole, she has been experiencing acid reflux again.   Also admits bloating/gas.  Patient denies dysphagia, globus, sour eructations, indigestion, early satiety, changes in appetite, coughing, nausea, vomiting, abdominal/epigastric pain, or changes in bowel habits.  Patient states that she has not had any recent changes in her medications.  Last visit (08/14/2020)              Patient presents today for with c/o acid reflux acid. She admits her symptoms began 2 months and describes them as a globus sensation, a raspy voice in the morning, she c/o intermittent nausea after eating no matter the content of the food. She reports that these symptoms occur 2-3 times a week.   She c/o that she will have a feeling of pressure starting at the bottom of her esophagus that will radiate to the top of esophagus.  She denies dysphagia at this time.    She states she will feel pressure in her chest every time after she eats.  She states she had a cardiac workup that was normal.  She admits that drinking any alcohol will burn her stomach, she reports that these are new symptoms.    She has pressure in her chest.  Patient currently reports bowel movements everyday or every other day. Her stools are formed with no blood, mucus, or melena present.   She is currently reports taking Pantoprazole 40 mg QAM and states that occasionally she will take Tums if needed. She reports that this medication hasn't helped with her symptoms in about 2 months.    Last visit (5/10/2019) Patient presents today for follow up after her EGD.  Since the procedure patient denies dysphagia, globus, changes in appetite, and changes in bowel habits. She continues to take Pantoprazole 40 mg on tab daily with on issues.;   Belching : She admits occasional episodes of belching since last visit.      Last Visit (10/07/2021) She denies any episodes of belching since last visit.        Last visit 02/08/2021 Continues to admit belching after eating or drinking liquid. Admits some relief with Dexilant 60 mg.   Last visit (12/19/2020)              Denies any episodes of Belching since her last visit. Admits symptoms are controlled by Dexilant 60 mg.   Last visit (09/14/2020)       Admits Belching with Dexilant and Famotidine. But since she is back to Pantoprazole, she has been belching post prandial daily.   Last visit (08/14/2020)              Patient admits episodes of sporadic belching since her last office visit even after drinking water. She reports that she experience these symptoms daily. Patient admits that she has made dietary changes to try and resolve symptoms with no relief.   Last visit (5/10/2019) Patient complains of belching frequently.  She states without eating, she will still find herself belching a lot.;   Change in bowel habits : Patient present today for 1 month follow-up of change in bowel habits. She is currently taking MiraLax daily and a probiotic with noticeable improvement. She denies sticking to the FODMAP diet. Currently has 1 BM every three days , stools are normal without blood, mucus or melena.         Last Visit (10/07/2021) 51 year old female who presents today for a consult for change in bowel habits. Patient states she has been having constipation. Onset began in early September.   Currently admits 3-4 bowel movements per week. Stools are normal without blood, mucus, or melena in stools. Patient states that she has been seeing white specks present in the stool. Her previous bowel habits were 1 movement per day, with normal stools.   She denies any diarrhea.  She has been gluten-free for years.  She denies aggravating/alleviating factors and has not tried any medications.  She admits associated LUQ abdominal pains just below ribs, bloating, and gas. When she has a bowel movement she does feel like she is completely emptying her bowels.  She states she saw her PCP, CT abd was fine, and then she was put on steroids, which helped the pain but now it's back again.  She can get on her back and the pain resolves, but laying on her left side is an issue.  She denies fecal incontinence.  Her last colonoscopy was 04/13/2018 ith Dr Chacon. She was placed on a 5 year repeat.;

## 2021-11-22 ENCOUNTER — TELEPHONE ENCOUNTER (OUTPATIENT)
Dept: URBAN - METROPOLITAN AREA CLINIC 35 | Facility: CLINIC | Age: 51
End: 2021-11-22

## 2021-11-22 ENCOUNTER — WEB ENCOUNTER (OUTPATIENT)
Dept: URBAN - METROPOLITAN AREA CLINIC 35 | Facility: CLINIC | Age: 51
End: 2021-11-22

## 2022-04-07 ENCOUNTER — TELEPHONE ENCOUNTER (OUTPATIENT)
Dept: URBAN - METROPOLITAN AREA CLINIC 36 | Facility: CLINIC | Age: 52
End: 2022-04-07

## 2022-04-07 RX ORDER — PANTOPRAZOLE SODIUM 40 MG/1
1 TABLET TABLET, DELAYED RELEASE ORAL ONCE A DAY
Qty: 90 | Refills: 2
Start: 2018-04-27

## 2022-09-07 ENCOUNTER — TELEPHONE ENCOUNTER (OUTPATIENT)
Dept: URBAN - METROPOLITAN AREA CLINIC 23 | Facility: CLINIC | Age: 52
End: 2022-09-07

## 2023-01-10 ENCOUNTER — WEB ENCOUNTER (OUTPATIENT)
Dept: URBAN - METROPOLITAN AREA CLINIC 35 | Facility: CLINIC | Age: 53
End: 2023-01-10

## 2023-01-10 RX ORDER — PANTOPRAZOLE SODIUM 40 MG/1
1 TABLET TABLET, DELAYED RELEASE ORAL ONCE A DAY
Qty: 30 | Refills: 0
Start: 2018-04-27

## 2023-02-08 ENCOUNTER — WEB ENCOUNTER (OUTPATIENT)
Dept: URBAN - METROPOLITAN AREA CLINIC 35 | Facility: CLINIC | Age: 53
End: 2023-02-08

## 2023-02-08 RX ORDER — PANTOPRAZOLE SODIUM 40 MG/1
1 TABLET TABLET, DELAYED RELEASE ORAL ONCE A DAY
Qty: 30 | Refills: 0
Start: 2018-04-27

## 2023-02-13 ENCOUNTER — OFFICE VISIT (OUTPATIENT)
Dept: URBAN - METROPOLITAN AREA CLINIC 35 | Facility: CLINIC | Age: 53
End: 2023-02-13
Payer: COMMERCIAL

## 2023-02-13 VITALS
DIASTOLIC BLOOD PRESSURE: 62 MMHG | SYSTOLIC BLOOD PRESSURE: 110 MMHG | WEIGHT: 145 LBS | OXYGEN SATURATION: 99 % | HEIGHT: 65 IN | BODY MASS INDEX: 24.16 KG/M2 | HEART RATE: 71 BPM

## 2023-02-13 DIAGNOSIS — R14.0 ABDOMINAL DISTENSION (GASEOUS): ICD-10-CM

## 2023-02-13 DIAGNOSIS — Z12.11 ENCOUNTER FOR SCREENING FOR MALIGNANT NEOPLASM OF COLON: ICD-10-CM

## 2023-02-13 DIAGNOSIS — K44.9 DIAPHRAGMATIC HERNIA WITHOUT OBSTRUCTION OR GANGRENE: ICD-10-CM

## 2023-02-13 DIAGNOSIS — K22.70 BARRETT'S ESOPHAGUS WITHOUT DYSPLASIA: ICD-10-CM

## 2023-02-13 DIAGNOSIS — Z12.12 ENCOUNTER FOR SCREENING FOR MALIGNANT NEOPLASM OF RECTUM: ICD-10-CM

## 2023-02-13 DIAGNOSIS — K21.00 GASTRO-ESOPHAGEAL REFLUX DISEASE WITH ESOPHAGITIS, WITHOUT BLEEDING: ICD-10-CM

## 2023-02-13 DIAGNOSIS — K29.30 CHRONIC SUPERFICIAL GASTRITIS WITHOUT BLEEDING: ICD-10-CM

## 2023-02-13 PROBLEM — 196735001 CSG - CHRONIC SUPERFICIAL GASTRITIS: Status: ACTIVE | Noted: 2018-04-27

## 2023-02-13 PROCEDURE — 99214 OFFICE O/P EST MOD 30 MIN: CPT | Performed by: PHYSICIAN ASSISTANT

## 2023-02-13 RX ORDER — LORATADINE 10 MG
1 PACKET MIXED WITH 8 OUNCES OF FLUID TABLET,DISINTEGRATING ORAL ONCE A DAY
Qty: 30 | Status: ACTIVE | COMMUNITY

## 2023-02-13 RX ORDER — SODIUM, POTASSIUM,MAG SULFATES 17.5-3.13G
177ML SOLUTION, RECONSTITUTED, ORAL ORAL
Qty: 1 | Refills: 0 | OUTPATIENT
Start: 2023-02-13 | End: 2023-02-15

## 2023-02-13 RX ORDER — PANTOPRAZOLE SODIUM 40 MG/1
1 TABLET TABLET, DELAYED RELEASE ORAL ONCE A DAY
Qty: 90 | Refills: 3

## 2023-02-13 RX ORDER — DEXLANSOPRAZOLE 60 MG/1
1 CAPSULE CAPSULE, DELAYED RELEASE ORAL ONCE A DAY
Qty: 30 | Status: ON HOLD | COMMUNITY

## 2023-02-13 RX ORDER — SUCRALFATE 1 G/10ML
10 ML ON AN EMPTY STOMACH BEFORE MEALS SUSPENSION ORAL TWICE A DAY
Qty: 280 ML | Refills: 0 | Status: ON HOLD | COMMUNITY
Start: 2020-12-29

## 2023-02-13 RX ORDER — RIFAXIMIN 550 MG/1
1 TABLET TABLET ORAL THREE TIMES A DAY
Qty: 30 TABLET | Refills: 0 | COMMUNITY
Start: 2021-11-01

## 2023-02-13 RX ORDER — SODIUM SULFATE, POTASSIUM SULFATE, MAGNESIUM SULFATE 17.5; 3.13; 1.6 G/ML; G/ML; G/ML
AS DIRECTED SOLUTION, CONCENTRATE ORAL
Qty: 1 | Refills: 0 | Status: ON HOLD | COMMUNITY
Start: 2018-02-06

## 2023-02-13 RX ORDER — FAMOTIDINE 40 MG/1
1 TABLET AT BEDTIME TABLET, FILM COATED ORAL ONCE A DAY
Qty: 30 | Refills: 2 | Status: ON HOLD | COMMUNITY
Start: 2020-08-14

## 2023-02-13 RX ORDER — PANTOPRAZOLE SODIUM 40 MG/1
1 TABLET TABLET, DELAYED RELEASE ORAL ONCE A DAY
Qty: 30 | Refills: 0 | Status: ACTIVE | COMMUNITY
Start: 2018-04-27

## 2023-02-13 RX ORDER — UBIDECARENONE 30 MG
CAPSULE ORAL
Status: ACTIVE | COMMUNITY

## 2023-02-13 RX ORDER — MULTIVIT-MIN/IRON/FOLIC ACID/K 18-600-40
AS DIRECTED CAPSULE ORAL
Status: ON HOLD | COMMUNITY

## 2023-02-13 RX ORDER — AMLODIPINE BESYLATE 5 MG/1
1 TABLET TABLET ORAL ONCE A DAY
Status: ACTIVE | COMMUNITY

## 2023-02-13 RX ORDER — CLONAZEPAM 0.5 MG/1
1 TABLET AT BEDTIME TABLET ORAL ONCE A DAY
Status: ACTIVE | COMMUNITY

## 2023-02-13 RX ORDER — B-COMPLEX WITH VITAMIN C
1 TABLET TABLET ORAL ONCE A DAY
Qty: 30 | Status: ON HOLD | COMMUNITY

## 2023-02-13 NOTE — HPI-COLORECTAL CANCER SCREENING
51 y/o female patient presents today for a colonoscopy screening. Her last colonoscopy was in 2018 with benign polyps removed, prior colonoscopy in 2013 with polyps removed as well. She denies a family history of colon, gastric, or esophageal cancer/polyps. Currently reports one bowel movement every other day without strain. Her stools are formed without blood, mucus, or melena. She denies any episodes of rectal pain or pruritus ani.  Pt states that she needs a refill of Pantoprazole.

## 2023-02-13 NOTE — HPI-BARRETT'S ESOPHAGUS
Admits use of Pantoprazole with relief of symptoms. She needs a refill.  Denies any breakthrough symptoms. Admits bloating/gas.  Last visit (11/15/2021): She is still currently taking Dexilant 60 mg daily without control of symptoms. She admits  breakthrough symptoms at least a few times a week. She admits occasional episodes of bloating/gas.

## 2023-02-21 PROBLEM — 302914006 BARRETT'S ESOPHAGUS: Status: ACTIVE | Noted: 2018-04-27

## 2023-04-14 ENCOUNTER — CLAIMS CREATED FROM THE CLAIM WINDOW (OUTPATIENT)
Dept: URBAN - METROPOLITAN AREA SURGERY CENTER 8 | Facility: SURGERY CENTER | Age: 53
End: 2023-04-14
Payer: COMMERCIAL

## 2023-04-14 ENCOUNTER — CLAIMS CREATED FROM THE CLAIM WINDOW (OUTPATIENT)
Dept: URBAN - METROPOLITAN AREA SURGERY CENTER 8 | Facility: SURGERY CENTER | Age: 53
End: 2023-04-14

## 2023-04-14 DIAGNOSIS — D12.0 ADENOMA OF CECUM: ICD-10-CM

## 2023-04-14 DIAGNOSIS — Z12.11 AVERAGE RISK FOR CRC. DUE TO PT'S CO-MORBID STATE WITH END STAGE DEMENTIA, HIGH RISK FOR ANESTHESIA (PER NEUROLOGY); INABILITY TO TAKE A BOWEL PREP....WOULD NOT ADVISE ANY COLORECTAL CANCER SCREENING INCLUDING STOOL TEST FOR FECAL BLOOD.: ICD-10-CM

## 2023-04-14 DIAGNOSIS — K22.89 DILATATION OF ESOPHAGUS: ICD-10-CM

## 2023-04-14 DIAGNOSIS — K29.60 ADENOPAPILLOMATOSIS GASTRICA: ICD-10-CM

## 2023-04-14 PROCEDURE — G8907 PT DOC NO EVENTS ON DISCHARG: HCPCS | Performed by: INTERNAL MEDICINE

## 2023-04-14 PROCEDURE — 45385 COLONOSCOPY W/LESION REMOVAL: CPT | Performed by: INTERNAL MEDICINE

## 2023-04-14 PROCEDURE — 43239 EGD BIOPSY SINGLE/MULTIPLE: CPT | Performed by: INTERNAL MEDICINE

## 2023-04-14 RX ORDER — MULTIVIT-MIN/IRON/FOLIC ACID/K 18-600-40
AS DIRECTED CAPSULE ORAL
Status: ON HOLD | COMMUNITY

## 2023-04-14 RX ORDER — DEXLANSOPRAZOLE 60 MG/1
1 CAPSULE CAPSULE, DELAYED RELEASE ORAL ONCE A DAY
Qty: 30 | Status: ON HOLD | COMMUNITY

## 2023-04-14 RX ORDER — FAMOTIDINE 40 MG/1
1 TABLET AT BEDTIME TABLET, FILM COATED ORAL ONCE A DAY
Qty: 30 | Refills: 2 | Status: ON HOLD | COMMUNITY
Start: 2020-08-14

## 2023-04-14 RX ORDER — UBIDECARENONE 30 MG
CAPSULE ORAL
Status: ACTIVE | COMMUNITY

## 2023-04-14 RX ORDER — B-COMPLEX WITH VITAMIN C
1 TABLET TABLET ORAL ONCE A DAY
Qty: 30 | Status: ON HOLD | COMMUNITY

## 2023-04-14 RX ORDER — SUCRALFATE 1 G/10ML
10 ML ON AN EMPTY STOMACH BEFORE MEALS SUSPENSION ORAL TWICE A DAY
Qty: 280 ML | Refills: 0 | Status: ON HOLD | COMMUNITY
Start: 2020-12-29

## 2023-04-14 RX ORDER — RIFAXIMIN 550 MG/1
1 TABLET TABLET ORAL THREE TIMES A DAY
Qty: 30 TABLET | Refills: 0 | COMMUNITY
Start: 2021-11-01

## 2023-04-14 RX ORDER — PANTOPRAZOLE SODIUM 40 MG/1
1 TABLET TABLET, DELAYED RELEASE ORAL ONCE A DAY
Qty: 90 | Refills: 3 | Status: ACTIVE | COMMUNITY

## 2023-04-14 RX ORDER — AMLODIPINE BESYLATE 5 MG/1
1 TABLET TABLET ORAL ONCE A DAY
Status: ACTIVE | COMMUNITY

## 2023-04-14 RX ORDER — LORATADINE 10 MG
1 PACKET MIXED WITH 8 OUNCES OF FLUID TABLET,DISINTEGRATING ORAL ONCE A DAY
Qty: 30 | Status: ACTIVE | COMMUNITY

## 2023-04-14 RX ORDER — SODIUM SULFATE, POTASSIUM SULFATE, MAGNESIUM SULFATE 17.5; 3.13; 1.6 G/ML; G/ML; G/ML
AS DIRECTED SOLUTION, CONCENTRATE ORAL
Qty: 1 | Refills: 0 | Status: ON HOLD | COMMUNITY
Start: 2018-02-06

## 2023-04-14 RX ORDER — CLONAZEPAM 0.5 MG/1
1 TABLET AT BEDTIME TABLET ORAL ONCE A DAY
Status: ACTIVE | COMMUNITY

## 2023-04-21 ENCOUNTER — TELEPHONE ENCOUNTER (OUTPATIENT)
Dept: URBAN - METROPOLITAN AREA CLINIC 33 | Facility: CLINIC | Age: 53
End: 2023-04-21

## 2023-05-01 ENCOUNTER — OFFICE VISIT (OUTPATIENT)
Dept: URBAN - METROPOLITAN AREA CLINIC 35 | Facility: CLINIC | Age: 53
End: 2023-05-01

## 2023-05-22 ENCOUNTER — DASHBOARD ENCOUNTERS (OUTPATIENT)
Age: 53
End: 2023-05-22

## 2023-05-22 ENCOUNTER — OFFICE VISIT (OUTPATIENT)
Dept: URBAN - METROPOLITAN AREA CLINIC 35 | Facility: CLINIC | Age: 53
End: 2023-05-22
Payer: COMMERCIAL

## 2023-05-22 VITALS
BODY MASS INDEX: 22.66 KG/M2 | SYSTOLIC BLOOD PRESSURE: 100 MMHG | HEIGHT: 65 IN | WEIGHT: 136 LBS | HEART RATE: 73 BPM | OXYGEN SATURATION: 99 % | DIASTOLIC BLOOD PRESSURE: 60 MMHG

## 2023-05-22 DIAGNOSIS — K22.70 BARRETT'S ESOPHAGUS WITHOUT DYSPLASIA: ICD-10-CM

## 2023-05-22 DIAGNOSIS — K21.00 GASTRO-ESOPHAGEAL REFLUX DISEASE WITH ESOPHAGITIS, WITHOUT BLEEDING: ICD-10-CM

## 2023-05-22 DIAGNOSIS — D12.0 ADENOMATOUS POLYP OF CECUM: ICD-10-CM

## 2023-05-22 DIAGNOSIS — K64.0 GRADE I HEMORRHOIDS: ICD-10-CM

## 2023-05-22 DIAGNOSIS — K29.30 CHRONIC SUPERFICIAL GASTRITIS WITHOUT BLEEDING: ICD-10-CM

## 2023-05-22 DIAGNOSIS — K44.9 DIAPHRAGMATIC HERNIA WITHOUT OBSTRUCTION OR GANGRENE: ICD-10-CM

## 2023-05-22 PROCEDURE — 99214 OFFICE O/P EST MOD 30 MIN: CPT | Performed by: PHYSICIAN ASSISTANT

## 2023-05-22 RX ORDER — PANTOPRAZOLE SODIUM 40 MG/1
1 TABLET TABLET, DELAYED RELEASE ORAL ONCE A DAY
Qty: 90 | Refills: 3 | Status: ACTIVE | COMMUNITY

## 2023-05-22 RX ORDER — UBIDECARENONE 30 MG
CAPSULE ORAL
Status: ACTIVE | COMMUNITY

## 2023-05-22 RX ORDER — PANTOPRAZOLE SODIUM 40 MG/1
1 TABLET TABLET, DELAYED RELEASE ORAL ONCE A DAY
Qty: 90 | Refills: 3

## 2023-05-22 RX ORDER — LORATADINE 10 MG
1 PACKET MIXED WITH 8 OUNCES OF FLUID TABLET,DISINTEGRATING ORAL ONCE A DAY
Qty: 30 | Status: ON HOLD | COMMUNITY

## 2023-05-22 RX ORDER — CLONAZEPAM 0.5 MG/1
1 TABLET AT BEDTIME TABLET ORAL ONCE A DAY
Status: ACTIVE | COMMUNITY

## 2023-05-22 RX ORDER — SUCRALFATE 1 G/10ML
10 ML ON AN EMPTY STOMACH BEFORE MEALS SUSPENSION ORAL TWICE A DAY
Qty: 280 ML | Refills: 0 | Status: ON HOLD | COMMUNITY
Start: 2020-12-29

## 2023-05-22 RX ORDER — METOPROLOL SUCCINATE 25 MG/1
1 TABLET TABLET, FILM COATED, EXTENDED RELEASE ORAL ONCE A DAY
Status: ACTIVE | COMMUNITY

## 2023-05-22 RX ORDER — SODIUM SULFATE, POTASSIUM SULFATE, MAGNESIUM SULFATE 17.5; 3.13; 1.6 G/ML; G/ML; G/ML
AS DIRECTED SOLUTION, CONCENTRATE ORAL
Qty: 1 | Refills: 0 | Status: ON HOLD | COMMUNITY
Start: 2018-02-06

## 2023-05-22 RX ORDER — RIFAXIMIN 550 MG/1
1 TABLET TABLET ORAL THREE TIMES A DAY
Qty: 30 TABLET | Refills: 0 | COMMUNITY
Start: 2021-11-01

## 2023-05-22 RX ORDER — DEXLANSOPRAZOLE 60 MG/1
1 CAPSULE CAPSULE, DELAYED RELEASE ORAL ONCE A DAY
Qty: 30 | Status: ON HOLD | COMMUNITY

## 2023-05-22 RX ORDER — B-COMPLEX WITH VITAMIN C
1 TABLET TABLET ORAL ONCE A DAY
Qty: 30 | Status: ON HOLD | COMMUNITY

## 2023-05-22 RX ORDER — MULTIVIT-MIN/IRON/FOLIC ACID/K 18-600-40
AS DIRECTED CAPSULE ORAL
Status: ON HOLD | COMMUNITY

## 2023-05-22 RX ORDER — FAMOTIDINE 40 MG/1
1 TABLET AT BEDTIME TABLET, FILM COATED ORAL ONCE A DAY
Qty: 30 | Refills: 2 | Status: ON HOLD | COMMUNITY
Start: 2020-08-14

## 2023-05-22 RX ORDER — AMLODIPINE BESYLATE 5 MG/1
1 TABLET TABLET ORAL ONCE A DAY
Status: ACTIVE | COMMUNITY

## 2023-05-22 NOTE — HPI-COLONOSCOPY FOLLOWUP
52 Year old female patient presents today for a follow up post colonoscopy. She denies complications following procedure. She currently reports 1 bowel movement per every day or every other day, without strain, stools are formed. She admits mucus in stool about every 2 weeks, she denies melena or blood in stools. Denies pruritus ani or rectal pain.  Colonoscopy was completed on 04/14/2023 with findings of: - Internal hemorrhoids. - One 6 mm Tubular adenoma polyp in the cecum         Last visit 02/13/2023 51 y/o female patient presents today for a colonoscopy screening. Her last colonoscopy was in 2018 with benign polyps removed, prior colonoscopy in 2013 with polyps removed as well. She denies a family history of colon, gastric, or esophageal cancer/polyps. Currently reports one bowel movement every other day without strain. Her stools are formed without blood, mucus, or melena. She denies any episodes of rectal pain or pruritus ani.  Pt states that she needs a refill of Pantoprazole.

## 2023-05-22 NOTE — HPI-BARRETT'S ESOPHAGUS
Patient completed EGD on 04/14/2023, she denies complications post procedure. Patient admits the continued symptoms of Hoyos's Esophagus since last visit. She denies associated symptoms such as:   dyspepsia, dysphagia, excessive belching, globus, sour eructations, she admits bloating/gas, denies early satiety, coughing, abdominal/epigastric pain, or changes in bowel habits.  She admits the continued use of pantoprazole 40 mg QD with improvemednt of symptoms. She admits following anti-reflux diet as advised at last visit.  EGD report indicates: - 2 cm hiatal hernia - Gerlaw:-colored mucosa suspicious for Hoyos's esophagus - FOCAL MILD CHRONIC GASTRITIS WITH REACTIVE CHANGES. - GIEMSA STAIN IS NEGATIVE FOR HELICOBACTER-LIKE ORGANISMS. - PAS/ALCIAN BLUE STAIN IS NEGATIVE FOR INTESTINAL METAPLASIA. - BENIGN SQUAMOUS AND GASTRIC TYPE MUCOSA WITH CHRONIC INFLAMMATION.  - ALCIAN BLUE/PAS IS NEGATIVE FOR INTESTINAL METAPLASIA.  - NEGATIVE FOR DYSPLASIA.      Last visit 02/13/2023 Admits use of Pantoprazole with relief of symptoms. She needs a refill.  Denies any breakthrough symptoms. Admits bloating/gas.

## 2024-05-27 ENCOUNTER — WEB ENCOUNTER (OUTPATIENT)
Dept: URBAN - METROPOLITAN AREA CLINIC 35 | Facility: CLINIC | Age: 54
End: 2024-05-27

## 2024-05-27 RX ORDER — PANTOPRAZOLE SODIUM 40 MG/1
1 TABLET TABLET, DELAYED RELEASE ORAL ONCE A DAY
Qty: 30 | Refills: 0

## 2024-06-10 ENCOUNTER — OFFICE VISIT (OUTPATIENT)
Dept: URBAN - METROPOLITAN AREA CLINIC 35 | Facility: CLINIC | Age: 54
End: 2024-06-10
Payer: COMMERCIAL

## 2024-06-10 VITALS
SYSTOLIC BLOOD PRESSURE: 112 MMHG | WEIGHT: 138 LBS | BODY MASS INDEX: 22.99 KG/M2 | DIASTOLIC BLOOD PRESSURE: 70 MMHG | HEIGHT: 65 IN

## 2024-06-10 DIAGNOSIS — K21.00 GASTRO-ESOPHAGEAL REFLUX DISEASE WITH ESOPHAGITIS, WITHOUT BLEEDING: ICD-10-CM

## 2024-06-10 DIAGNOSIS — K44.9 DIAPHRAGMATIC HERNIA WITHOUT OBSTRUCTION OR GANGRENE: ICD-10-CM

## 2024-06-10 DIAGNOSIS — K22.70 BARRETT'S ESOPHAGUS WITHOUT DYSPLASIA: ICD-10-CM

## 2024-06-10 DIAGNOSIS — K29.60 ADENOPAPILLOMATOSIS GASTRICA: ICD-10-CM

## 2024-06-10 PROCEDURE — 99213 OFFICE O/P EST LOW 20 MIN: CPT | Performed by: PHYSICIAN ASSISTANT

## 2024-06-10 RX ORDER — SUCRALFATE 1 G/10ML
10 ML ON AN EMPTY STOMACH BEFORE MEALS SUSPENSION ORAL TWICE A DAY
Qty: 280 ML | Refills: 0 | Status: ON HOLD | COMMUNITY
Start: 2020-12-29

## 2024-06-10 RX ORDER — CLONAZEPAM 0.5 MG/1
1 TABLET AT BEDTIME TABLET ORAL ONCE A DAY
Status: ACTIVE | COMMUNITY

## 2024-06-10 RX ORDER — SODIUM SULFATE, POTASSIUM SULFATE, MAGNESIUM SULFATE 17.5; 3.13; 1.6 G/ML; G/ML; G/ML
AS DIRECTED SOLUTION, CONCENTRATE ORAL
Qty: 1 | Refills: 0 | Status: ON HOLD | COMMUNITY
Start: 2018-02-06

## 2024-06-10 RX ORDER — LORATADINE 10 MG
1 PACKET MIXED WITH 8 OUNCES OF FLUID TABLET,DISINTEGRATING ORAL ONCE A DAY
Qty: 30 | Status: ON HOLD | COMMUNITY

## 2024-06-10 RX ORDER — PANTOPRAZOLE SODIUM 40 MG/1
1 TABLET TABLET, DELAYED RELEASE ORAL ONCE A DAY
Qty: 90 | Refills: 3

## 2024-06-10 RX ORDER — UBIDECARENONE 30 MG
CAPSULE ORAL
Status: ACTIVE | COMMUNITY

## 2024-06-10 RX ORDER — METOPROLOL SUCCINATE 25 MG/1
1 TABLET TABLET, FILM COATED, EXTENDED RELEASE ORAL ONCE A DAY
Status: ACTIVE | COMMUNITY

## 2024-06-10 RX ORDER — B-COMPLEX WITH VITAMIN C
1 TABLET TABLET ORAL ONCE A DAY
Qty: 30 | Status: ON HOLD | COMMUNITY

## 2024-06-10 RX ORDER — DEXLANSOPRAZOLE 60 MG/1
1 CAPSULE CAPSULE, DELAYED RELEASE ORAL ONCE A DAY
Qty: 30 | Status: ON HOLD | COMMUNITY

## 2024-06-10 RX ORDER — AMLODIPINE BESYLATE 5 MG/1
1 TABLET TABLET ORAL ONCE A DAY
Status: ACTIVE | COMMUNITY

## 2024-06-10 RX ORDER — RIFAXIMIN 550 MG/1
1 TABLET TABLET ORAL THREE TIMES A DAY
Qty: 30 TABLET | Refills: 0 | Status: ON HOLD | COMMUNITY
Start: 2021-11-01

## 2024-06-10 RX ORDER — MULTIVIT-MIN/IRON/FOLIC ACID/K 18-600-40
AS DIRECTED CAPSULE ORAL
Status: ON HOLD | COMMUNITY

## 2024-06-10 RX ORDER — PANTOPRAZOLE SODIUM 40 MG/1
1 TABLET TABLET, DELAYED RELEASE ORAL ONCE A DAY
Qty: 30 | Refills: 0 | Status: ACTIVE | COMMUNITY

## 2024-06-10 RX ORDER — FAMOTIDINE 40 MG/1
1 TABLET AT BEDTIME TABLET, FILM COATED ORAL ONCE A DAY
Qty: 30 | Refills: 2 | Status: ON HOLD | COMMUNITY
Start: 2020-08-14

## 2025-06-11 ENCOUNTER — OFFICE VISIT (OUTPATIENT)
Dept: URBAN - METROPOLITAN AREA CLINIC 33 | Facility: CLINIC | Age: 55
End: 2025-06-11

## 2025-06-16 ENCOUNTER — OFFICE VISIT (OUTPATIENT)
Dept: URBAN - METROPOLITAN AREA CLINIC 35 | Facility: CLINIC | Age: 55
End: 2025-06-16

## 2025-06-17 ENCOUNTER — OFFICE VISIT (OUTPATIENT)
Dept: URBAN - METROPOLITAN AREA CLINIC 35 | Facility: CLINIC | Age: 55
End: 2025-06-17
Payer: COMMERCIAL

## 2025-06-17 DIAGNOSIS — K22.70 BARRETT'S ESOPHAGUS WITHOUT DYSPLASIA: ICD-10-CM

## 2025-06-17 DIAGNOSIS — K29.30 CHRONIC SUPERFICIAL GASTRITIS WITHOUT BLEEDING: ICD-10-CM

## 2025-06-17 DIAGNOSIS — K44.9 DIAPHRAGMATIC HERNIA WITHOUT OBSTRUCTION OR GANGRENE: ICD-10-CM

## 2025-06-17 DIAGNOSIS — K21.00 GASTRO-ESOPHAGEAL REFLUX DISEASE WITH ESOPHAGITIS, WITHOUT BLEEDING: ICD-10-CM

## 2025-06-17 DIAGNOSIS — K64.0 GRADE I HEMORRHOIDS: ICD-10-CM

## 2025-06-17 DIAGNOSIS — R12 HEARTBURN: ICD-10-CM

## 2025-06-17 DIAGNOSIS — D12.0 ADENOMATOUS POLYP OF CECUM: ICD-10-CM

## 2025-06-17 PROCEDURE — 99214 OFFICE O/P EST MOD 30 MIN: CPT | Performed by: PHYSICIAN ASSISTANT

## 2025-06-17 RX ORDER — PANTOPRAZOLE SODIUM 40 MG/1
1 TABLET TABLET, DELAYED RELEASE ORAL ONCE A DAY
Qty: 90 | Refills: 3 | Status: ACTIVE | COMMUNITY

## 2025-06-17 RX ORDER — FAMOTIDINE 40 MG/1
1 TABLET AT BEDTIME TABLET, FILM COATED ORAL ONCE A DAY
Qty: 30 | Refills: 2 | Status: ON HOLD | COMMUNITY
Start: 2020-08-14

## 2025-06-17 RX ORDER — VONOPRAZAN FUMARATE 26.72 MG/1
1 TABLET TABLET ORAL ONCE A DAY
Qty: 30 | Refills: 3 | OUTPATIENT
Start: 2025-06-17 | End: 2025-10-15

## 2025-06-17 RX ORDER — RIFAXIMIN 550 MG/1
1 TABLET TABLET ORAL THREE TIMES A DAY
Qty: 30 TABLET | Refills: 0 | Status: ON HOLD | COMMUNITY
Start: 2021-11-01

## 2025-06-17 RX ORDER — METOPROLOL SUCCINATE 25 MG/1
1 TABLET TABLET, FILM COATED, EXTENDED RELEASE ORAL ONCE A DAY
Status: ACTIVE | COMMUNITY

## 2025-06-17 RX ORDER — CLONAZEPAM 0.5 MG/1
1 TABLET AT BEDTIME TABLET ORAL ONCE A DAY
Status: ACTIVE | COMMUNITY

## 2025-06-17 RX ORDER — UBIDECARENONE 30 MG
CAPSULE ORAL
Status: ACTIVE | COMMUNITY

## 2025-06-17 RX ORDER — SODIUM SULFATE, POTASSIUM SULFATE, MAGNESIUM SULFATE 17.5; 3.13; 1.6 G/ML; G/ML; G/ML
AS DIRECTED SOLUTION, CONCENTRATE ORAL
Qty: 1 | Refills: 0 | Status: ON HOLD | COMMUNITY
Start: 2018-02-06

## 2025-06-17 RX ORDER — AMLODIPINE BESYLATE 5 MG/1
1 TABLET TABLET ORAL ONCE A DAY
Status: ACTIVE | COMMUNITY

## 2025-06-17 NOTE — HPI-BARRETT'S ESOPHAGUS
54-year-old female presents for a follow-up evaluation of Hoyos's Esophagus. The patient admits continued use of Pantoprazole 40 mg daily with adequate control of gastroesophageal symptoms. She admits some mornings she will wake up with a burning sensation. She denies experiencing dysphagia, abdominal pain, or nausea. She denies any breakthrough episodes of regurgitation. She denies any sensation of food sticking in the throat or chest. She denies persistent cough, hoarseness, or sore throat, particularly in the morning. She denies any episodes of chest discomfort not related to exertion. She denies unintentional weight loss or changes in appetite.       Provider notes Pt notices some mornings she will wake up with burning in her chest and esophagus.  She denies dysphagia or early satiety.  No abdominal pain. She takes her Pantoprazole 40mg daily. She also notices burning in her stomach during the day as well.  She states if she's hungry she will have increased heartburn in the upper abdomen.  No NSAID use.  She has been on Pantoprazole for a few years. Due to surveillance of Hoyos's next year.  Last visit (6/10/2024): 53 year old female presents for follow up of Barretts Esophagus .Patient admits continuing Pantoprazole 40mg with/without control of symptoms . Patient denies dysphagia , abdominal pain  or nausea . Patient denies any breakthrough episodes .

## 2025-06-23 ENCOUNTER — WEB ENCOUNTER (OUTPATIENT)
Dept: URBAN - METROPOLITAN AREA CLINIC 35 | Facility: CLINIC | Age: 55
End: 2025-06-23

## 2025-06-23 RX ORDER — PANTOPRAZOLE SODIUM 40 MG/1
1 TABLET TABLET, DELAYED RELEASE ORAL ONCE A DAY
Qty: 90 | Refills: 0

## 2025-06-23 RX ORDER — FAMOTIDINE 40 MG/1
1 TABLET AT BEDTIME TABLET, FILM COATED ORAL ONCE A DAY
Qty: 30 | Refills: 0
Start: 2020-08-14

## 2025-06-24 ENCOUNTER — WEB ENCOUNTER (OUTPATIENT)
Dept: URBAN - METROPOLITAN AREA CLINIC 35 | Facility: CLINIC | Age: 55
End: 2025-06-24